# Patient Record
Sex: MALE | Race: WHITE | Employment: OTHER | URBAN - METROPOLITAN AREA
[De-identification: names, ages, dates, MRNs, and addresses within clinical notes are randomized per-mention and may not be internally consistent; named-entity substitution may affect disease eponyms.]

---

## 2017-09-28 PROBLEM — R55 VASO VAGAL EPISODE: Status: ACTIVE | Noted: 2017-09-28

## 2017-12-05 PROBLEM — R55 SYNCOPE, NON CARDIAC: Status: ACTIVE | Noted: 2017-12-05

## 2018-01-09 ENCOUNTER — TELEPHONE (OUTPATIENT)
Dept: CARDIOLOGY CLINIC | Age: 83
End: 2018-01-09

## 2018-01-18 ENCOUNTER — TELEPHONE (OUTPATIENT)
Dept: CARDIOLOGY CLINIC | Age: 83
End: 2018-01-18

## 2018-12-08 ENCOUNTER — APPOINTMENT (OUTPATIENT)
Dept: GENERAL RADIOLOGY | Age: 83
End: 2018-12-08
Payer: MEDICARE

## 2018-12-08 ENCOUNTER — HOSPITAL ENCOUNTER (EMERGENCY)
Age: 83
Discharge: HOME | End: 2018-12-08
Attending: EMERGENCY MEDICINE
Payer: MEDICARE

## 2018-12-08 VITALS
BODY MASS INDEX: 20.38 KG/M2 | HEART RATE: 78 BPM | OXYGEN SATURATION: 99 % | TEMPERATURE: 97.8 F | SYSTOLIC BLOOD PRESSURE: 120 MMHG | WEIGHT: 138 LBS | RESPIRATION RATE: 21 BRPM | DIASTOLIC BLOOD PRESSURE: 68 MMHG

## 2018-12-08 DIAGNOSIS — T88.7XXA MEDICATION SIDE EFFECT: ICD-10-CM

## 2018-12-08 DIAGNOSIS — R42 LIGHTHEADEDNESS: Primary | ICD-10-CM

## 2018-12-08 LAB
A/G RATIO: 1.3 (ref 1.1–2.2)
ALBUMIN SERPL-MCNC: 3.9 G/DL (ref 3.4–5)
ALP BLD-CCNC: 77 U/L (ref 40–129)
ALT SERPL-CCNC: 17 U/L (ref 10–40)
ANION GAP SERPL CALCULATED.3IONS-SCNC: 15 MMOL/L (ref 3–16)
AST SERPL-CCNC: 23 U/L (ref 15–37)
BASOPHILS ABSOLUTE: 0 K/UL (ref 0–0.2)
BASOPHILS RELATIVE PERCENT: 0.2 %
BILIRUB SERPL-MCNC: 0.6 MG/DL (ref 0–1)
BUN BLDV-MCNC: 25 MG/DL (ref 7–20)
CALCIUM SERPL-MCNC: 9.3 MG/DL (ref 8.3–10.6)
CHLORIDE BLD-SCNC: 103 MMOL/L (ref 99–110)
CO2: 23 MMOL/L (ref 21–32)
CREAT SERPL-MCNC: 1 MG/DL (ref 0.8–1.3)
EOSINOPHILS ABSOLUTE: 0 K/UL (ref 0–0.6)
EOSINOPHILS RELATIVE PERCENT: 0.4 %
GFR AFRICAN AMERICAN: >60
GFR NON-AFRICAN AMERICAN: >60
GLOBULIN: 3.1 G/DL
GLUCOSE BLD-MCNC: 120 MG/DL (ref 70–99)
HCT VFR BLD CALC: 41 % (ref 40.5–52.5)
HEMOGLOBIN: 13.6 G/DL (ref 13.5–17.5)
LYMPHOCYTES ABSOLUTE: 0.9 K/UL (ref 1–5.1)
LYMPHOCYTES RELATIVE PERCENT: 10.5 %
MAGNESIUM: 1.8 MG/DL (ref 1.8–2.4)
MCH RBC QN AUTO: 31.9 PG (ref 26–34)
MCHC RBC AUTO-ENTMCNC: 33.2 G/DL (ref 31–36)
MCV RBC AUTO: 96.1 FL (ref 80–100)
MONOCYTES ABSOLUTE: 0.9 K/UL (ref 0–1.3)
MONOCYTES RELATIVE PERCENT: 9.9 %
NEUTROPHILS ABSOLUTE: 6.9 K/UL (ref 1.7–7.7)
NEUTROPHILS RELATIVE PERCENT: 79 %
PDW BLD-RTO: 13.3 % (ref 12.4–15.4)
PLATELET # BLD: 184 K/UL (ref 135–450)
PMV BLD AUTO: 9.3 FL (ref 5–10.5)
POTASSIUM SERPL-SCNC: 3.7 MMOL/L (ref 3.5–5.1)
PRO-BNP: 958 PG/ML (ref 0–449)
RBC # BLD: 4.26 M/UL (ref 4.2–5.9)
SODIUM BLD-SCNC: 141 MMOL/L (ref 136–145)
TOTAL PROTEIN: 7 G/DL (ref 6.4–8.2)
TROPONIN: <0.01 NG/ML
WBC # BLD: 8.8 K/UL (ref 4–11)

## 2018-12-08 PROCEDURE — 83735 ASSAY OF MAGNESIUM: CPT

## 2018-12-08 PROCEDURE — 80053 COMPREHEN METABOLIC PANEL: CPT

## 2018-12-08 PROCEDURE — 84484 ASSAY OF TROPONIN QUANT: CPT

## 2018-12-08 PROCEDURE — 93005 ELECTROCARDIOGRAM TRACING: CPT | Performed by: EMERGENCY MEDICINE

## 2018-12-08 PROCEDURE — 85025 COMPLETE CBC W/AUTO DIFF WBC: CPT

## 2018-12-08 PROCEDURE — 99284 EMERGENCY DEPT VISIT MOD MDM: CPT

## 2018-12-08 PROCEDURE — 71046 X-RAY EXAM CHEST 2 VIEWS: CPT

## 2018-12-08 PROCEDURE — 83880 ASSAY OF NATRIURETIC PEPTIDE: CPT

## 2018-12-08 RX ORDER — AMOXICILLIN AND CLAVULANATE POTASSIUM 875; 125 MG/1; MG/1
1 TABLET, FILM COATED ORAL 2 TIMES DAILY
Qty: 14 TABLET | Refills: 0 | Status: SHIPPED | OUTPATIENT
Start: 2018-12-08 | End: 2018-12-15

## 2018-12-09 LAB
EKG ATRIAL RATE: 79 BPM
EKG DIAGNOSIS: NORMAL
EKG P AXIS: 59 DEGREES
EKG P-R INTERVAL: 172 MS
EKG Q-T INTERVAL: 384 MS
EKG QRS DURATION: 86 MS
EKG QTC CALCULATION (BAZETT): 440 MS
EKG R AXIS: 85 DEGREES
EKG T AXIS: 70 DEGREES
EKG VENTRICULAR RATE: 79 BPM

## 2018-12-09 PROCEDURE — 93010 ELECTROCARDIOGRAM REPORT: CPT | Performed by: INTERNAL MEDICINE

## 2018-12-09 NOTE — ED PROVIDER NOTES
 Sexual activity: Not Currently     Other Topics Concern    Not on file     Social History Narrative    No narrative on file     No current facility-administered medications for this encounter. Current Outpatient Prescriptions   Medication Sig Dispense Refill    amoxicillin-clavulanate (AUGMENTIN) 875-125 MG per tablet Take 1 tablet by mouth 2 times daily for 7 days 14 tablet 0    metoprolol tartrate (LOPRESSOR) 25 MG tablet Take 1 tablet by mouth 2 times daily 60 tablet 3    omeprazole (PRILOSEC) 40 MG delayed release capsule Take 40 mg by mouth daily      aspirin 325 MG tablet Take 1 tablet by mouth daily. 30 tablet 0    therapeutic multivitamin-minerals (THERAGRAN-M) tablet Take 1 tablet by mouth daily. Allergies   Allergen Reactions    Levaquin [Levofloxacin]     Moxifloxacin      Nursing Notes Reviewed    Physical Exam:  ED Triage Vitals [12/08/18 7919]   Enc Vitals Group      /72      Pulse 84      Resp 20      Temp 97.8 °F (36.6 °C)      Temp Source Oral      SpO2 100 %      Weight 138 lb (62.6 kg)      Height       Head Circumference       Peak Flow       Pain Score       Pain Loc       Pain Edu? Excl. in [address]? CONSTITUTIONAL:  Well-nourished well-hydrated. Elderly male. Talkative. NAD. Awake alert and oriented ×3. Cooperative. Follows commands. GCS of 15  HEAD: Normocephalic atraumatic head. EYES: Pupils equal round and reactive to light. EOMi  ENT: Nares clear. No sinus ttp. Mild drainage. MMM. NECK: Supple. Full range of motion. Trachea is midline  CARDIOLOGY: Adequate peripheral perfusion. S1 and S2.  Regular rate and rhythm. No murmurs rubs or gallops. No JVD. No lower extremity edema. +2 radial pulses bilaterally  RESPIRATORY: Nonlabored. Clear to auscultation bilaterally with no rhonchi wheezes or crackles. ABDOMEN: Positive bowel sounds. Soft nondistended nontender. MUSCULOSKELETAL: No gross deformities.   Moves all four extremities, good

## 2019-04-11 ENCOUNTER — HOSPITAL ENCOUNTER (OUTPATIENT)
Age: 84
Discharge: HOME | End: 2019-04-11
Payer: MEDICARE

## 2019-04-11 ENCOUNTER — HOSPITAL ENCOUNTER (OUTPATIENT)
Dept: GENERAL RADIOLOGY | Age: 84
Discharge: HOME | End: 2019-04-11
Payer: MEDICARE

## 2019-04-11 DIAGNOSIS — J18.9 COMMUNITY ACQUIRED PNEUMONIA, UNSPECIFIED LATERALITY: ICD-10-CM

## 2019-04-11 PROCEDURE — 71046 X-RAY EXAM CHEST 2 VIEWS: CPT

## 2020-08-05 ENCOUNTER — APPOINTMENT (OUTPATIENT)
Dept: CT IMAGING | Age: 85
End: 2020-08-05
Payer: MEDICARE

## 2020-08-05 ENCOUNTER — APPOINTMENT (OUTPATIENT)
Dept: GENERAL RADIOLOGY | Age: 85
End: 2020-08-05
Payer: MEDICARE

## 2020-08-05 ENCOUNTER — HOSPITAL ENCOUNTER (OUTPATIENT)
Age: 85
Setting detail: OBSERVATION
Discharge: HOME | End: 2020-08-06
Attending: EMERGENCY MEDICINE | Admitting: INTERNAL MEDICINE
Payer: MEDICARE

## 2020-08-05 PROBLEM — G45.9 TIA (TRANSIENT ISCHEMIC ATTACK): Status: ACTIVE | Noted: 2020-08-05

## 2020-08-05 LAB
A/G RATIO: 2 (ref 1.1–2.2)
ALBUMIN SERPL-MCNC: 4.5 G/DL (ref 3.4–5)
ALP BLD-CCNC: 98 U/L (ref 40–129)
ALT SERPL-CCNC: 17 U/L (ref 10–40)
AMMONIA: 18 UMOL/L (ref 16–60)
ANION GAP SERPL CALCULATED.3IONS-SCNC: 11 MMOL/L (ref 3–16)
AST SERPL-CCNC: 24 U/L (ref 15–37)
BASOPHILS ABSOLUTE: 0 K/UL (ref 0–0.2)
BASOPHILS RELATIVE PERCENT: 0.5 %
BILIRUB SERPL-MCNC: 0.4 MG/DL (ref 0–1)
BILIRUBIN URINE: NEGATIVE
BLOOD, URINE: NEGATIVE
BUN BLDV-MCNC: 33 MG/DL (ref 7–20)
CALCIUM SERPL-MCNC: 9.5 MG/DL (ref 8.3–10.6)
CHLORIDE BLD-SCNC: 104 MMOL/L (ref 99–110)
CLARITY: CLEAR
CO2: 24 MMOL/L (ref 21–32)
COLOR: YELLOW
CREAT SERPL-MCNC: 0.9 MG/DL (ref 0.8–1.3)
EOSINOPHILS ABSOLUTE: 0.1 K/UL (ref 0–0.6)
EOSINOPHILS RELATIVE PERCENT: 2 %
GFR AFRICAN AMERICAN: >60
GFR NON-AFRICAN AMERICAN: >60
GLOBULIN: 2.2 G/DL
GLUCOSE BLD-MCNC: 76 MG/DL (ref 70–99)
GLUCOSE URINE: NEGATIVE MG/DL
HCT VFR BLD CALC: 37.6 % (ref 40.5–52.5)
HEMOGLOBIN: 13 G/DL (ref 13.5–17.5)
INR BLD: 1.02 (ref 0.86–1.14)
KETONES, URINE: NEGATIVE MG/DL
LEUKOCYTE ESTERASE, URINE: NEGATIVE
LIPASE: 37 U/L (ref 13–60)
LYMPHOCYTES ABSOLUTE: 2.6 K/UL (ref 1–5.1)
LYMPHOCYTES RELATIVE PERCENT: 41.9 %
MCH RBC QN AUTO: 33.5 PG (ref 26–34)
MCHC RBC AUTO-ENTMCNC: 34.4 G/DL (ref 31–36)
MCV RBC AUTO: 97.4 FL (ref 80–100)
MICROSCOPIC EXAMINATION: NORMAL
MONOCYTES ABSOLUTE: 0.6 K/UL (ref 0–1.3)
MONOCYTES RELATIVE PERCENT: 10.1 %
NEUTROPHILS ABSOLUTE: 2.8 K/UL (ref 1.7–7.7)
NEUTROPHILS RELATIVE PERCENT: 45.5 %
NITRITE, URINE: NEGATIVE
PDW BLD-RTO: 14 % (ref 12.4–15.4)
PH UA: 7 (ref 5–8)
PLATELET # BLD: 210 K/UL (ref 135–450)
PMV BLD AUTO: 9.4 FL (ref 5–10.5)
POTASSIUM REFLEX MAGNESIUM: 4.1 MMOL/L (ref 3.5–5.1)
PROTEIN UA: NEGATIVE MG/DL
PROTHROMBIN TIME: 11.8 SEC (ref 10–13.2)
RBC # BLD: 3.87 M/UL (ref 4.2–5.9)
SODIUM BLD-SCNC: 139 MMOL/L (ref 136–145)
SPECIFIC GRAVITY UA: 1.01 (ref 1–1.03)
TOTAL PROTEIN: 6.7 G/DL (ref 6.4–8.2)
TROPONIN: <0.01 NG/ML
URINE REFLEX TO CULTURE: NORMAL
URINE TYPE: NORMAL
UROBILINOGEN, URINE: 0.2 E.U./DL
WBC # BLD: 6.1 K/UL (ref 4–11)

## 2020-08-05 PROCEDURE — 84484 ASSAY OF TROPONIN QUANT: CPT

## 2020-08-05 PROCEDURE — 80053 COMPREHEN METABOLIC PANEL: CPT

## 2020-08-05 PROCEDURE — 85025 COMPLETE CBC W/AUTO DIFF WBC: CPT

## 2020-08-05 PROCEDURE — 6360000004 HC RX CONTRAST MEDICATION: Performed by: PHYSICIAN ASSISTANT

## 2020-08-05 PROCEDURE — 85610 PROTHROMBIN TIME: CPT

## 2020-08-05 PROCEDURE — 81003 URINALYSIS AUTO W/O SCOPE: CPT

## 2020-08-05 PROCEDURE — 93005 ELECTROCARDIOGRAM TRACING: CPT | Performed by: PHYSICIAN ASSISTANT

## 2020-08-05 PROCEDURE — 83690 ASSAY OF LIPASE: CPT

## 2020-08-05 PROCEDURE — G0378 HOSPITAL OBSERVATION PER HR: HCPCS

## 2020-08-05 PROCEDURE — 99285 EMERGENCY DEPT VISIT HI MDM: CPT

## 2020-08-05 PROCEDURE — 70450 CT HEAD/BRAIN W/O DYE: CPT

## 2020-08-05 PROCEDURE — 82140 ASSAY OF AMMONIA: CPT

## 2020-08-05 PROCEDURE — 71045 X-RAY EXAM CHEST 1 VIEW: CPT

## 2020-08-05 PROCEDURE — 70496 CT ANGIOGRAPHY HEAD: CPT

## 2020-08-05 RX ORDER — POLYETHYLENE GLYCOL 3350 17 G/17G
17 POWDER, FOR SOLUTION ORAL DAILY PRN
Status: DISCONTINUED | OUTPATIENT
Start: 2020-08-05 | End: 2020-08-06 | Stop reason: HOSPADM

## 2020-08-05 RX ORDER — ASPIRIN 81 MG/1
81 TABLET ORAL DAILY
Status: DISCONTINUED | OUTPATIENT
Start: 2020-08-06 | End: 2020-08-06 | Stop reason: HOSPADM

## 2020-08-05 RX ORDER — SODIUM CHLORIDE 0.9 % (FLUSH) 0.9 %
10 SYRINGE (ML) INJECTION PRN
Status: DISCONTINUED | OUTPATIENT
Start: 2020-08-05 | End: 2020-08-06 | Stop reason: HOSPADM

## 2020-08-05 RX ORDER — ONDANSETRON 2 MG/ML
4 INJECTION INTRAMUSCULAR; INTRAVENOUS EVERY 6 HOURS PRN
Status: DISCONTINUED | OUTPATIENT
Start: 2020-08-05 | End: 2020-08-06 | Stop reason: HOSPADM

## 2020-08-05 RX ORDER — ATORVASTATIN CALCIUM 80 MG/1
40 TABLET, FILM COATED ORAL NIGHTLY
Status: DISCONTINUED | OUTPATIENT
Start: 2020-08-05 | End: 2020-08-06 | Stop reason: HOSPADM

## 2020-08-05 RX ORDER — LABETALOL HYDROCHLORIDE 5 MG/ML
10 INJECTION, SOLUTION INTRAVENOUS EVERY 10 MIN PRN
Status: DISCONTINUED | OUTPATIENT
Start: 2020-08-05 | End: 2020-08-06 | Stop reason: HOSPADM

## 2020-08-05 RX ORDER — ASPIRIN 81 MG/1
324 TABLET, CHEWABLE ORAL ONCE
Status: DISCONTINUED | OUTPATIENT
Start: 2020-08-05 | End: 2020-08-05

## 2020-08-05 RX ORDER — PROMETHAZINE HYDROCHLORIDE 25 MG/1
12.5 TABLET ORAL EVERY 6 HOURS PRN
Status: DISCONTINUED | OUTPATIENT
Start: 2020-08-05 | End: 2020-08-06 | Stop reason: HOSPADM

## 2020-08-05 RX ORDER — ASPIRIN 300 MG/1
300 SUPPOSITORY RECTAL DAILY
Status: DISCONTINUED | OUTPATIENT
Start: 2020-08-06 | End: 2020-08-06 | Stop reason: HOSPADM

## 2020-08-05 RX ORDER — SODIUM CHLORIDE 0.9 % (FLUSH) 0.9 %
10 SYRINGE (ML) INJECTION EVERY 12 HOURS SCHEDULED
Status: DISCONTINUED | OUTPATIENT
Start: 2020-08-05 | End: 2020-08-06 | Stop reason: HOSPADM

## 2020-08-05 RX ADMIN — IOPAMIDOL 75 ML: 755 INJECTION, SOLUTION INTRAVENOUS at 19:49

## 2020-08-05 NOTE — ED NOTES
Bed: 02  Expected date:   Expected time:   Means of arrival:   Comments:  [facility], [address] [facility]  08/05/20 1944

## 2020-08-06 ENCOUNTER — APPOINTMENT (OUTPATIENT)
Dept: GENERAL RADIOLOGY | Age: 85
End: 2020-08-06
Payer: MEDICARE

## 2020-08-06 ENCOUNTER — APPOINTMENT (OUTPATIENT)
Dept: MRI IMAGING | Age: 85
End: 2020-08-06
Payer: MEDICARE

## 2020-08-06 VITALS
OXYGEN SATURATION: 98 % | HEIGHT: 69 IN | RESPIRATION RATE: 17 BRPM | BODY MASS INDEX: 19.96 KG/M2 | SYSTOLIC BLOOD PRESSURE: 155 MMHG | WEIGHT: 134.8 LBS | HEART RATE: 57 BPM | TEMPERATURE: 97.4 F | DIASTOLIC BLOOD PRESSURE: 75 MMHG

## 2020-08-06 LAB
CHOLESTEROL, TOTAL: 161 MG/DL (ref 0–199)
EKG ATRIAL RATE: 71 BPM
EKG DIAGNOSIS: NORMAL
EKG P AXIS: 70 DEGREES
EKG P-R INTERVAL: 194 MS
EKG Q-T INTERVAL: 412 MS
EKG QRS DURATION: 88 MS
EKG QTC CALCULATION (BAZETT): 447 MS
EKG R AXIS: 73 DEGREES
EKG T AXIS: 52 DEGREES
EKG VENTRICULAR RATE: 71 BPM
GLUCOSE BLD-MCNC: 102 MG/DL (ref 70–99)
GLUCOSE BLD-MCNC: 112 MG/DL (ref 70–99)
HCT VFR BLD CALC: 36.7 % (ref 40.5–52.5)
HDLC SERPL-MCNC: 49 MG/DL (ref 40–60)
HEMOGLOBIN: 12.3 G/DL (ref 13.5–17.5)
LDL CHOLESTEROL CALCULATED: NORMAL MG/DL
LDL CHOLESTEROL DIRECT: 111 MG/DL
LV EF: 58 %
LVEF MODALITY: NORMAL
MCH RBC QN AUTO: 32.4 PG (ref 26–34)
MCHC RBC AUTO-ENTMCNC: 33.4 G/DL (ref 31–36)
MCV RBC AUTO: 97.1 FL (ref 80–100)
PDW BLD-RTO: 13.6 % (ref 12.4–15.4)
PERFORMED ON: ABNORMAL
PERFORMED ON: ABNORMAL
PLATELET # BLD: 186 K/UL (ref 135–450)
PMV BLD AUTO: 9.3 FL (ref 5–10.5)
RBC # BLD: 3.78 M/UL (ref 4.2–5.9)
TRIGL SERPL-MCNC: 28 MG/DL (ref 0–150)
VLDLC SERPL CALC-MCNC: NORMAL MG/DL
WBC # BLD: 5.9 K/UL (ref 4–11)

## 2020-08-06 PROCEDURE — 80061 LIPID PANEL: CPT

## 2020-08-06 PROCEDURE — 93306 TTE W/DOPPLER COMPLETE: CPT

## 2020-08-06 PROCEDURE — 92610 EVALUATE SWALLOWING FUNCTION: CPT

## 2020-08-06 PROCEDURE — G0378 HOSPITAL OBSERVATION PER HR: HCPCS

## 2020-08-06 PROCEDURE — 36415 COLL VENOUS BLD VENIPUNCTURE: CPT

## 2020-08-06 PROCEDURE — 97165 OT EVAL LOW COMPLEX 30 MIN: CPT

## 2020-08-06 PROCEDURE — 6370000000 HC RX 637 (ALT 250 FOR IP): Performed by: INTERNAL MEDICINE

## 2020-08-06 PROCEDURE — 85027 COMPLETE CBC AUTOMATED: CPT

## 2020-08-06 PROCEDURE — 70551 MRI BRAIN STEM W/O DYE: CPT

## 2020-08-06 PROCEDURE — 92526 ORAL FUNCTION THERAPY: CPT

## 2020-08-06 PROCEDURE — 96372 THER/PROPH/DIAG INJ SC/IM: CPT

## 2020-08-06 PROCEDURE — 6360000002 HC RX W HCPCS: Performed by: INTERNAL MEDICINE

## 2020-08-06 PROCEDURE — 74230 X-RAY XM SWLNG FUNCJ C+: CPT

## 2020-08-06 PROCEDURE — 97530 THERAPEUTIC ACTIVITIES: CPT

## 2020-08-06 PROCEDURE — 2580000003 HC RX 258: Performed by: INTERNAL MEDICINE

## 2020-08-06 PROCEDURE — 93010 ELECTROCARDIOGRAM REPORT: CPT | Performed by: INTERNAL MEDICINE

## 2020-08-06 PROCEDURE — 97116 GAIT TRAINING THERAPY: CPT

## 2020-08-06 PROCEDURE — 99220 PR INITIAL OBSERVATION CARE/DAY 70 MINUTES: CPT | Performed by: PSYCHIATRY & NEUROLOGY

## 2020-08-06 PROCEDURE — 97161 PT EVAL LOW COMPLEX 20 MIN: CPT

## 2020-08-06 PROCEDURE — 92611 MOTION FLUOROSCOPY/SWALLOW: CPT

## 2020-08-06 PROCEDURE — 92523 SPEECH SOUND LANG COMPREHEN: CPT

## 2020-08-06 RX ORDER — ATORVASTATIN CALCIUM 40 MG/1
40 TABLET, FILM COATED ORAL NIGHTLY
Qty: 30 TABLET | Refills: 3 | Status: SHIPPED | OUTPATIENT
Start: 2020-08-06

## 2020-08-06 RX ORDER — PANTOPRAZOLE SODIUM 40 MG/1
40 TABLET, DELAYED RELEASE ORAL
Status: DISCONTINUED | OUTPATIENT
Start: 2020-08-06 | End: 2020-08-06 | Stop reason: HOSPADM

## 2020-08-06 RX ORDER — MELOXICAM 15 MG/1
15 TABLET ORAL DAILY
COMMUNITY

## 2020-08-06 RX ORDER — M-VIT,TX,IRON,MINS/CALC/FOLIC 27MG-0.4MG
1 TABLET ORAL DAILY
Status: DISCONTINUED | OUTPATIENT
Start: 2020-08-06 | End: 2020-08-06 | Stop reason: HOSPADM

## 2020-08-06 RX ORDER — TAMSULOSIN HYDROCHLORIDE 0.4 MG/1
0.4 CAPSULE ORAL NIGHTLY
COMMUNITY

## 2020-08-06 RX ADMIN — Medication 10 ML: at 09:21

## 2020-08-06 RX ADMIN — ASPIRIN 81 MG: 81 TABLET, COATED ORAL at 09:20

## 2020-08-06 RX ADMIN — MULTIPLE VITAMINS W/ MINERALS TAB 1 TABLET: TAB at 09:20

## 2020-08-06 RX ADMIN — PANTOPRAZOLE SODIUM 40 MG: 40 TABLET, DELAYED RELEASE ORAL at 09:20

## 2020-08-06 RX ADMIN — ENOXAPARIN SODIUM 40 MG: 40 INJECTION SUBCUTANEOUS at 09:21

## 2020-08-06 ASSESSMENT — PAIN SCALES - GENERAL
PAINLEVEL_OUTOF10: 0
PAINLEVEL_OUTOF10: 0

## 2020-08-06 NOTE — PROCEDURES
use of aspiration precautions and use of small bolus amounts. Aspiration/Penetration Risk:  Increased risk with large amounts of thin liquids and increased textures   Increased risk with all textures due to reduced/delayed pharyngeal clearing    Recommendations:    Diet Level:   1.) Dysphagia III Soft and Bite-Sized with thin liquids, no straws, meds in puree   2.) Use of SMALL sips with liquids    Strategies:  Upright 90 degrees at meals; No Straws;  Meds with puree; Small bites/sips  Treatments: Speech Therapy for dysphagia treatment  Goals:  1)Pt will tolerate diet with no signs or symptoms of aspiration   2) Pt will improve Oral Motor/ Bolus Control via exercises to 5/5 each   3)Pt will improve sensory/motor reflexive responses via DPNS    Consistencies given: Thin, Mildly Thick (Nectar) Liquids, Moderately Thick (honey) Liquids, Puree, Soft solid, Solid    Oral Phase  -Prolonged mastication  -Reduced bolus control  -Decreased palatal retraction for bolus transfer  -Lingual pumping  -Reduced A-P bolus propulsion    Pharyngeal Phase  -Delayed swallow onset  -Decreased epiglottic distension  -Pooling to the pyriforms with solids  -Decreased hyolaryngeal excursion   -Decreased/delayed UES opening  -Laryngeal penetration with thin liquids, nectar thick liquids, and honey thick liquids  -Decreased pharyngeal clearing with pyriform residue (increased with thicker textures)  -No aspiration was viewed with any texture during study     Esophageal Phase  Unremarkable    Following Evaluation:  Results/recommendations and education given to Patient (requires ongoing education) and nurse, who verbalized understanding    Timed Code Treatment: 0 minutes    Total Treatment Time: 30 minutes    Nic Bailey M.A., [address] [place] [address]  Speech-Language Pathologist

## 2020-08-06 NOTE — ED NOTES
Pt ambulatory at home and lives at home alone, son currently at bedside. Pt denies recent falls. Continent of bowel and bladder.  A&OX4      Frank Yoder, RN  52/43/90 [id number]

## 2020-08-06 NOTE — PROGRESS NOTES
VSS,  PT MRI neg for [place],  ECHO ok. PT/OT recommend pt can go home to independent living. Rcv'd order to DC pt to home.

## 2020-08-06 NOTE — PROGRESS NOTES
Speech Language/Pathology   SPEECH LANGUAGE AND CLINICAL BEDSIDE SWALLOWING EVALUATION   Speech Therapy Department     Patient Name:  Marii Minor  :  1935  Pain level: Pt did not report pain  Medical Diagnosis:  TIA (transient ischemic attack) [G45.9]  TIA (transient ischemic attack) [G45.9]    1. HPI: Per chart: TIA, suspected. Continue antiplatelet therapy, statin. Check lipid profile in the morning. Monitor on telemetry. Patient does have history of paroxysmal atrial fibrillation in the past; consider cardiology consult to assist with evaluation for possible anticoagulation. Check echocardiogram with bubble study. MRI-brain without contrast.  Neurology consult. Neurochecks ordered. PT/OT/ST consults. Other comorbidities: History of paroxysmal atrial fibrillation not on chronic anticoagulation. Chest x-ray:  Increased lung markings at the bilateral parahilar regions, may be related to    mild pulmonary vascular congestion versus bronchitis. CT of Head:  No acute intracranial abnormality. CLINICAL SWALLOW EVALUATION:  Dysphagia Treatment Diagnosis: Mild-Moderate Oropharyngeal Dysphagia     Impressions: Pt was seen sitting upright in bed, Pt reported difficulty with swallowing however had difficulty providing details. Pt reported he has lost weight recently. Various textures were provided to assess swallow function. Pt presents with mild-moderate oropharyngeal dysphagia characterized by prolonged mastication, suspected premature bolus loss to pharynx, delayed swallow initiation, and reduced laryngeal elevation upon manual palpation. Thin liquids via cup revealed use of multiple swallows with suspected reduced pharyngeal clearing, and intermittent delayed coughing/throat clearing. Pt reported noted difficulty with thin liquids. Improved tolerance was assessed with nectar thick liquids with no overt clinical s/s of aspiration/penetration.  Prolonged mastication was noted with regular solids, given extra time adequate clearing was achieved. Pt did report difficulty with swallowing hard/dry textures at home. At this time, a MBS is recommended to further assess the pharyngeal phase of swallowing. Dietary Recommendations:   1.) Dysphagia III Soft and Bite-Sized with Mildly Thick (Nectar) Liquids, no straws, meds in puree  2.) Recommend Modified Barium Swallow Study to further assess the pharyngeal phase of swallowing and to assess for aspiration     Strategies: 90 degree positioning with all p.o. intake; small bites/sips; alternate textures through meal; reduce rate of intake; No straws     Dysphagia Treatment/Goals: Speech therapy for dysphagia tx 3-5 times per week. ST.) Pt will tolerate recommended diet without s/s of aspiration   2.) If clinical symptoms of penetration/aspiration continue to be noted,Pt will tolerate MBS to r/o aspiration and determine appropriate diet/liquid level.    3.) Pt will tolerate diet advance to least restricted diet, as clinically indicated, with no signs of aspiration      SPEECH LANGUAGE EVALUATION:  Speech Language Treatment Diagnosis: Cognitive-Linguistic Deficits     Oral Motor exam/Motor Speech:  Oral Motor: no overt facial weakness noted   Motor Speech: no overt dysarthria, speech is intelligible      Verbal Expression:   -Able to express thoughts, intermittent word finding difficulty noted however appeared to be related to cognition/memory deficits  -No overt difficulty with basic naming tasks  -Tangential speech noted with reduced thought organization    Auditory Comprehension:   -Mildly delayed processing with complex information/directions  -Able to follow one and two step commands, repetition required for three step commands    Cognitive-Linguistic:   -Oriented to self, facility, current month, date, and year  -Decreased recall of detailed events  -Difficulty providing direct responses to questions, tangential speech noted  -Reduced thought organization  -Pt reported difficulty with his memory, Pt's son has noticed a gradual decline however Pt is still largely independent  -Reduced divided attention  -Recommend ongoing assessment of cognitive-linguistic skills     Plan: Speech therapy 3-5 times a weeks for speech-language treatment, and dysphagia treatment     Discharge Recommendations:  Pt will benefit from continued skilled Speech Therapy for Speech and Dysphagia services, prior to returning home. Prior Status: Pt lives at home and is largely independent per report. Speech Language Short-term goals: 1. Pt will improve auditory processing/comprehension of complex commands and questions to 80%, via graded tasks   2. Pt will improve short term recall via graded tasks to 80%  3. Pt will participate in ongoing assessment of cognitive-linguistic skills as indicated. Patient/Family Education:Education, results and recommendations given to the Pt and nurse, who verbalized understanding    Timed Code Treatment:  0 minutes    Total Treatment Time:  50 minutes     If patient discharges prior to next session this note will serve as a discharge summary.      Bertha Gonzalez M.A., [address] [place] [address]  Speech-Language Pathologist

## 2020-08-06 NOTE — H&P
Hospital Medicine History and Physical    8/5/2020    Date of Admission: 8/5/2020    Date of Service: Pt seen/examined on 8/5/2020 and admitted to observation. Assessment/plan:  1. TIA, suspected. Continue antiplatelet therapy, statin. Check lipid profile in the morning. Monitor on telemetry. Patient does have history of paroxysmal atrial fibrillation in the past; consider cardiology consult to assist with evaluation for possible anticoagulation. Check echocardiogram with bubble study. MRI-brain without contrast.  Neurology consult. Neurochecks ordered. PT/OT/ST consults. 2. Other comorbidities: History of paroxysmal atrial fibrillation not on chronic anticoagulation. Activities: Up with assist  Prophylaxis: Subcutaneous Lovenox, PPI  Code status: Full code    ==========================================================  Chief complaint:  Chief Complaint   Patient presents with    Altered Mental Status     Pt at [facility], brought in via [facility], friends from [facility] called and stated that patient had altered mental status. Stroke alert called. History of Presenting Illness: This is a pleasant 81 y.o. male with history of paroxysmal atrial fibrillation (not on chronic anticoagulation), BPH, who presents from [facility] where he was noticed to be staggering, confused. According to paramedics, on arrival, it appeared patient had left facial droop. By the time patient arrived in the emergency room, symptom had resolved. He was alert and oriented, had no facial droop in the emergency room. He reports taking aspirin 325 mg this morning. He does not have any symptoms at this time. Stroke alert was called, did not receive TPA due to resolution of symptoms. He had normal CT-head, CTA-head/neck. Labs were pretty much unremarkable. Patient is being admitted for further evaluation of possible TIA.     Past Medical History:      Diagnosis Date    Arthritis     cervical degenerative joint affect. Not agitated. Skin: Warm, dry with normal turgor. No rash  Capillary refill: Brisk,< 3 seconds   Peripheral Pulses: +2 palpable, equal bilaterally       Labs/imaging/EKG:  CBC:   Recent Labs     08/05/20 2024   WBC 6.1   HGB 13.0*        BMP:    Recent Labs     08/05/20 2024      K 4.1      CO2 24   BUN 33*   CREATININE 0.9   GLUCOSE 76     Hepatic:   Recent Labs     08/05/20 2024   AST 24   ALT 17   BILITOT 0.4   ALKPHOS 98       Ct Head Wo Contrast    Result Date: 8/5/2020  EXAMINATION: CT OF THE HEAD WITHOUT CONTRAST  8/5/2020 4:45 pm TECHNIQUE: CT of the head was performed without the administration of intravenous contrast. Dose modulation, iterative reconstruction, and/or weight based adjustment of the mA/kV was utilized to reduce the radiation dose to as low as reasonably achievable. COMPARISON: 12/05/2017 HISTORY: ORDERING SYSTEM PROVIDED HISTORY: AMS TECHNOLOGIST PROVIDED HISTORY: Has a \"code stroke\" or \"stroke alert\" been called? ->Yes Reason for exam:->AMS Reason for Exam: AMS. Acuity: Acute Type of Exam: Initial FINDINGS: BRAIN/VENTRICLES:  No acute loss of the gray-white matter differentiation is identified to suggest acute or subacute infarct. No masses or hemorrhages within the brain parenchyma are found. No evidence of midline shift. There is mild periventricular low-attenuation, compatible with chronic small vessel ischemic disease. The intracranial vasculature, including the dural venous sinuses, is within normal limits. ORBITS: No acute orbital abnormalities are identified. SINUSES: The visualized paranasal sinuses and mastoid air cells are clear. SOFT TISSUES/SKULL: The calvarium is intact. Extracranial soft tissues are unremarkable. No acute intracranial abnormality. Findings were discussed with Audrey Yoon at 7:55 pm on 8/5/2020.      Xr Chest Portable    Result Date: 8/5/2020  EXAMINATION: ONE XRAY VIEW OF THE CHEST 8/5/2020 7:45 pm COMPARISON: April 11, 2019 HISTORY: ORDERING SYSTEM PROVIDED HISTORY: AMS TECHNOLOGIST PROVIDED HISTORY: Reason for exam:->AMS FINDINGS: The cardiomediastinal silhouette is stable. There are increased lung markings at the bilateral parahilar regions, likely related to mild pulmonary vascular congestion versus bronchitis. There is no pleural effusion. There is no pneumothorax. There is no acute osseous abnormality. Increased lung markings at the bilateral parahilar regions, may be related to mild pulmonary vascular congestion versus bronchitis. Cta Head Neck W Contrast    Result Date: 8/5/2020  EXAMINATION: CTA OF THE HEAD AND NECK WITH CONTRAST 8/5/2020 7:49 pm: TECHNIQUE: CTA of the head and neck was performed with the administration of intravenous contrast. Multiplanar reformatted images are provided for review. MIP images are provided for review. Stenosis of the internal carotid arteries measured using NASCET criteria. Dose modulation, iterative reconstruction, and/or weight based adjustment of the mA/kV was utilized to reduce the radiation dose to as low as reasonably achievable. COMPARISON: None. HISTORY: ORDERING SYSTEM PROVIDED HISTORY: AMS TECHNOLOGIST PROVIDED HISTORY: Reason for exam:->AMS Reason for Exam: AMS Acuity: Acute Type of Exam: Initial FINDINGS: CTA NECK: AORTIC ARCH/ARCH VESSELS: No dissection or arterial injury. No significant stenosis of the brachiocephalic or subclavian arteries. CAROTID ARTERIES: No dissection, arterial injury, or hemodynamically significant stenosis by NASCET criteria. VERTEBRAL ARTERIES: No dissection, arterial injury, or significant stenosis. SOFT TISSUES: The lung apices are clear. No cervical or superior mediastinal lymphadenopathy. The larynx and pharynx are unremarkable. No acute abnormality of the salivary and thyroid glands. BONES: There is no acute fracture or suspect osseous lesion.   There are flowing anterior osteophytes in the lower cervical and upper thoracic spine compatible with diffuse idiopathic skeletal hyperostosis. No significant bony spinal canal stenosis evident. CTA HEAD: ANTERIOR CIRCULATION: No significant stenosis of the intracranial internal carotid, anterior cerebral, or middle cerebral arteries. The right anterior cerebral artery A1 segment is hypoplastic. No aneurysm. POSTERIOR CIRCULATION: No significant stenosis of the vertebral, basilar, or posterior cerebral arteries. No aneurysm. OTHER: No dural venous sinus thrombosis on this non-dedicated study. BRAIN: No mass effect or midline shift. No extra-axial fluid collection. The gray-white differentiation is maintained. No acute arterial abnormality or hemodynamically significant arterial stenosis in the head or neck. EKG: Normal sinus rhythm with no acute ST/T changes. I reviewed EKG. Discussed with ER provider.       Thank you Veola Peabody, MD for the opportunity to be involved in this patient's care.    -----------------------------  Michaela Grullon MD  [facility] hospitalist

## 2020-08-06 NOTE — PROGRESS NOTES
Pt appears A&Ox4 but is impulsive. VSS and assessment complete. Orthostatic BP checked. NIHSS 0. Neurochecks completed. Pt now resting with eyes closed.

## 2020-08-06 NOTE — PLAN OF CARE
Nutrition Problem #1: Inadequate energy intake  Intervention: Food and/or Nutrient Delivery: Continue Current Diet, Start Oral Nutrition Supplement  Nutritional Goals: PO 50% or more at meals/supp

## 2020-08-06 NOTE — PROGRESS NOTES
[facility] PROGRESS NOTE    8/6/2020 9:58 AM        Name: Zulma Smith . Admitted: 8/5/2020  Primary Care Provider: Angela Coffman MD (Tel: [phone])                        Subjective:  . No acute events overnight. Resting well. Pain control. Diet ok. Labs reviewed  Denies any chest pain sob. Reviewed interval ancillary notes    Current Medications  pantoprazole (PROTONIX) tablet 40 mg, QAM AC  therapeutic multivitamin-minerals 1 tablet, Daily  sodium chloride flush 0.9 % injection 10 mL, 2 times per day  sodium chloride flush 0.9 % injection 10 mL, PRN  polyethylene glycol (GLYCOLAX) packet 17 g, Daily PRN  promethazine (PHENERGAN) tablet 12.5 mg, Q6H PRN    Or  ondansetron (ZOFRAN) injection 4 mg, Q6H PRN  atorvastatin (LIPITOR) tablet 40 mg, Nightly  enoxaparin (LOVENOX) injection 40 mg, Daily  aspirin EC tablet 81 mg, Daily    Or  aspirin suppository 300 mg, Daily  labetalol (NORMODYNE;TRANDATE) injection 10 mg, Q10 Min PRN        Objective:  BP (!) 153/90   Pulse 62   Temp 97.5 °F (36.4 °C) (Temporal)   Resp 16   Ht 5' 9\" (1.753 m)   Wt 134 lb 12.8 oz (61.1 kg)   SpO2 99%   BMI 19.91 kg/m²   No intake or output data in the 24 hours ending 08/06/20 0958   Wt Readings from Last 3 Encounters:   08/06/20 134 lb 12.8 oz (61.1 kg)   12/08/18 138 lb (62.6 kg)   12/06/17 142 lb 1.6 oz (64.5 kg)       General appearance:  Appears comfortable  Eyes: Sclera clear. Pupils equal.  ENT: Moist oral mucosa. Trachea midline, no adenopathy. Cardiovascular: Regular rhythm, normal S1, S2. No murmur. No edema in lower extremities  Respiratory: Not using accessory muscles. Good inspiratory effort. Clear to auscultation bilaterally, no wheeze or crackles.    GI: Abdomen soft, no tenderness, not distended, normal bowel sounds  Musculoskeletal: No cyanosis in digits, neck

## 2020-08-06 NOTE — PROGRESS NOTES
Data- discharge order received, pt verbalized agreement to discharge, disposition to previous residence, no needs for HHC/DME. Action- discharge instructions prepared and given to pt, pt verbalized understanding. Medication information packet given r/t NEW and/or CHANGED prescriptions emphasizing name/purpose/side effects, pt verbalized understanding. Discharge instruction summary: Diet- dysphagia advanced, Activity- as tolerated, avoid working outside in heat of day, Primary Care Physician as follows: Nehemiah Maldonado -[phone] f/u appointment this week, immunizations reviewed, prescription medications filled electronically to [facility] in [address]. Response- Pt belongings gathered, IV removed. Disposition is home (no HHC/DME needs), transported with belongings and son Lazarus Durbin, taken to lobby via w/c w/ with nurse, no complications.

## 2020-08-06 NOTE — CARE COORDINATION
Patient admitted as Observation/OPIB with an anticipated short hospitalization length of stay. Chart reviewed and it appears that patient has minimal needs for discharge at this time. Discussed with patients nurse and requested that case management be notified if discharge needs are identified. Am-Pac 24/24=PT, 22/24 =OT due to decreased cognition, & memory. *Case management will continue to follow progress and update discharge plan as needed.

## 2020-08-06 NOTE — ED NOTES
When patient was being lifted onto stretcher with ceiling lift, patients head hit CT scanner, small cut to forehead, provider aware and CT repeated.       Gwynne Cushing, RN  09/98/49 7260

## 2020-08-06 NOTE — ED NOTES
Report given to Hernan Rojo, nurse resuming care of patient. No questions or concerns at this time.       Nasrin Alvarado RN  64/09/80 0010

## 2020-08-06 NOTE — ED PROVIDER NOTES
This patient was seen by the Mid-Level Provider. I have seen and examined the patient, agree with the workup, evaluation, management and diagnosis. Care plan has been discussed. My assessment reveals a 41-year-old male who presents with an episode of confusion. This is a 41-year-old male who was at [employer] who apparently became confused. When he arrived the EMS also felt that he had some left-sided facial drooping that his gotten better and resolved. The patient is a poor historian and is difficult to get a good history out of him. He denies any symptoms at this time. Radiology results:    XR CHEST PORTABLE   Final Result   Increased lung markings at the bilateral parahilar regions, may be related to   mild pulmonary vascular congestion versus bronchitis. CTA HEAD NECK W CONTRAST   Final Result   No acute arterial abnormality or hemodynamically significant arterial   stenosis in the head or neck. CT HEAD WO CONTRAST   Final Result   Addendum 1 of 1   ADDENDUM:   Additional scan was performed after the patient hit his head on the CT   scanner. There is contrast within the vasculature, which limits detection   intracranial hemorrhage. That said, no masses or hemorrhages within the   brain parenchyma are found. Gray-white matter differentiation is    maintained. There is a small focus of enhancement seen within the rightward aspect of    the   hai measuring approximately 5 x 3 mm (series 2, image 19). 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       All in all, no acute traumatic abnormalities are detected         Final      MRI brain without contrast    (Results Pending)         LABS:    Labs Reviewed   CBC WITH AUTO DIFFERENTIAL - Abnormal; Notable for the following components:       Result Value    RBC 3.87 (*)     Hemoglobin 13.0 (*)     Hematocrit 37.6 (*)     All other components within normal limits Narrative:     Performed at:  [facility]  [address] [employer], [place]   Phone [phone]   COMPREHENSIVE METABOLIC PANEL W/ REFLEX TO MG FOR LOW K - Abnormal; Notable for the following components:    BUN 33 (*)     All other components within normal limits    Narrative:     Performed at:  [facility]  [address] [employer], [place]   Phone [phone]   TROPONIN    Narrative:     Performed at:  [facility] [address] [employer], [place]   Phone [phone]   PROTIME-INR    Narrative:     Performed at:  [facility] [address], [place]   Phone [phone]   URINE RT REFLEX TO CULTURE    Narrative:     Performed at:  [facility] [address], [place]   Phone [phone]   AMMONIA    Narrative:     Performed at:  [facility] [address] [employer], [place]   Phone [phone]   LIPASE    Narrative:     Performed at:  [facility]  [address]   Phone [phone]   LIPID PANEL   CBC   POCT GLUCOSE           EKG:    Sinus rhythm at a rate of 71 beats a minute with no acute ST elevations or depressions or pathologic Q waves. Normal axis. Exam:    Well-nourished male in no acute distress. He had no obvious focal neurological motor or sensory deficits throughout. Medical decision making: This is an 71-year-old male who presents with an episode of confusion and some possible left-sided facial drooping. His work-up so far is unremarkable normal.  My differential included CVA versus TIA. He will be admitted for further care. FINAL IMPRESSION:    1.  Altered mental status, unspecified altered mental status type      Critical care time: 35 minutes of critical care time spent with this patient with multiple visits to the bedside.        Belkis Guzman MD  08/05/20 1516

## 2020-08-06 NOTE — CONSULTS
kidney disease     Glaucoma     [place] (hard of hearing)     Macular degeneration disease     Movement disorder     degenerative joint disease    Pneumonia     twice in past 2 years     Social History     Tobacco Use    Smoking status: Former Smoker     Last attempt to quit: 1973     Years since quittin.9    Smokeless tobacco: Never Used   Substance Use Topics    Alcohol use: No    Drug use: Not on file     Allergies   Allergen Reactions    Levaquin [Levofloxacin]     Moxifloxacin      Current Facility-Administered Medications   Medication Dose Route Frequency Provider Last Rate Last Dose    pantoprazole (PROTONIX) tablet 40 mg  40 mg Oral QAM AC Adeel Tenorio MD   40 mg at 20 0920    therapeutic multivitamin-minerals 1 tablet  1 tablet Oral Daily Adeel Tenorio MD   1 tablet at 20 0920    sodium chloride flush 0.9 % injection 10 mL  10 mL Intravenous 2 times per day Adeel Tenorio MD   10 mL at 20 5433    sodium chloride flush 0.9 % injection 10 mL  10 mL Intravenous PRN Adeel Tenorio MD        polyethylene glycol (GLYCOLAX) packet 17 g  17 g Oral Daily PRN Adeel Tenorio MD        promethazine (PHENERGAN) tablet 12.5 mg  12.5 mg Oral Q6H PRN Adeel Tenorio MD        Or    ondansetron (ZOFRAN) injection 4 mg  4 mg Intravenous Q6H PRN Adeel Tenorio MD        atorvastatin (LIPITOR) tablet 40 mg  40 mg Oral Nightly Adeel Tenorio MD        enoxaparin (LOVENOX) injection 40 mg  40 mg Subcutaneous Daily Adeel Tenorio MD   40 mg at 20 4151    aspirin EC tablet 81 mg  81 mg Oral Daily Adeel Tenorio MD   81 mg at 20 0920    Or    aspirin suppository 300 mg  300 mg Rectal Daily Adeel Tneorio MD        labetalol (NORMODYNE;TRANDATE) injection 10 mg  10 mg Intravenous Q10 Min PRN Adeel Tenorio MD           ROS : A 10-14 system review of constitutional, cardiovascular, respiratory, eyes, musculoskeletal, endocrine, GI, ENT, skin, hematological, genitourinary, psychiatric and neurologic systems was obtained and updated today and is unremarkable except as mentioned in my HPI      Exam:     Constitutional:   Vitals:    08/06/20 0057 08/06/20 0502 08/06/20 0832 08/06/20 0915   BP: (!) 162/62 128/73  (!) 153/90   Pulse: 64 57  62   Resp: 18 18  16   Temp: 97.6 °F (36.4 °C) 97.5 °F (36.4 °C)  97.5 °F (36.4 °C)   TempSrc: Temporal Temporal  Temporal   SpO2: 100% 99%  99%   Weight:   134 lb 12.8 oz (61.1 kg)    Height:           General appearance:  Normal development and appear in no acute distress. Eye: No icterus. Fundus: Funduscopic examination cannot be performed due to COVID19 restrictions and precautions. Neck: supple  Cardiovascular: No lower leg edema with good pulsation. Mental Status:   AAO x3  Good attention and concentration  Language is fluent and no evidence of aphasia. Poor fund of knowledge and poor insight  Poor immediate recall  Intact remote memory  Cranial Nerves:   II: Visual fields: Full. Pupils: equal, round, reactive to light  III,IV,VI: Extra Ocular Movements are intact. No nystagmus  V: Facial sensation is intact   VII: Facial strength and movements: intact and symmetric  VIII: Hearing: Intact to finger rub bilaterally  IX: Palate elevation is symmetric  XI: Shoulder shrug is intact  XII: Tongue movements are normal  Musculoskeletal: 5/5 in all 4 extremities. Tone: Normal tone. Reflexes: Bilateral biceps 2/4, triceps 2/4, brachial radialis 2/4, knee 2/4 and ankle 2/4. Planters: flexor bilaterally. Coordination: no pronator drift, no dysmetria with FNF in upper extremities. Normal REM. Sensation: normal to all modalities in both arms and legs. Gait/Posture: steady gait and normal posturing and station.      Data:  LABS:   Lab Results   Component Value Date     08/05/2020    K 4.1 08/05/2020     08/05/2020    CO2 24 08/05/2020    BUN 33 08/05/2020    CREATININE 0.9 08/05/2020    GFRAA >60 08/05/2020    GFRAA >60 03/12/2013    LABGLOM >60 08/05/2020    GLUCOSE 76 08/05/2020    PHOS 2.8 03/02/2015    MG 1.80 12/08/2018    CALCIUM 9.5 08/05/2020     Lab Results   Component Value Date    WBC 5.9 08/06/2020    RBC 3.78 08/06/2020    HGB 12.3 08/06/2020    HCT 36.7 08/06/2020    MCV 97.1 08/06/2020    RDW 13.6 08/06/2020     08/06/2020     Lab Results   Component Value Date    INR 1.02 08/05/2020    PROTIME 11.8 08/05/2020       Neuroimaging were independently reviewed by myself and discussed results with the patient  Reviewed notes from different physicians  Reviewed lab and blood testing    Impression:  Acute encephalopathy with a left facial droop. Possible TIA. So far cryptogenic. Hypertension, not controlled  History of PAF  Chronic cognitive impairment  Anemia    Recommendation:  MRI brain  Echo  A1c  Blood sugar monitor and control  Aspirin  Statin  Telemetry  Continue blood pressure monitor and resume home blood pressure medications  DVT and GI prophylaxis  Speech evaluation  Long discussion with the patient today regarding stroke prevention and risk of recurrence. Follow CBC  Could be a candidate for anticoagulation after obtaining MRI of the brain and after weighing benefits/risk ratio with the patient. He denies any recurrent falling or history of GI bleed in the past.  Patient however has underlying dementia based on examination today. Will follow. MDM: High complexity  ------------------------------------------------------------------------------      ABCD 4 with risk of stroke from 4 to 10% in the first 90 days. Thank you for referring such patient. If you have any questions regarding my consult note, please don't hesitate to call me. Katy Fragoso MD  [phone]    This dictation was generated by voice recognition computer software.  Although all attempts are made to edit the dictation for accuracy, there may be errors in the  transcription that are not intended

## 2020-08-06 NOTE — PROGRESS NOTES
Physical Therapy    Facility/Department: [address]  Initial Assessment/Discharge Summary    NAME: Jamal Dao  : 1935  MRN: [id number]    Date of Service: 2020    Discharge Recommendations: Jamal Dao scored a 24/24 on the AM-PAC short mobility form. At this time, no further PT is recommended upon discharge due to pt being at baseline functional mobility. Recommend patient returns to prior setting with prior services. PT Equipment Recommendations  Equipment Needed: No    Assessment   Assessment: Pt is presenting at baseline functional mobility and does not require skilled PT services at this time. As such, pt is being discharged from PT caseload. Prognosis: Excellent  Decision Making: Low Complexity  Clinical Presentation: stable  PT Education: PT Role;General Safety  Patient Education: d/c recommendations--pt verbalizing understanding  Barriers to Learning: none  REQUIRES PT FOLLOW UP: No  Activity Tolerance  Activity Tolerance: Patient Tolerated treatment well       Patient Diagnosis(es): The encounter diagnosis was Altered mental status, unspecified altered mental status type. has a past medical history of Arthritis, Atrial fibrillation ([address] Blood circulation, collateral, BPH (benign prostatic hypertrophy), Cataract of both eyes, Chronic kidney disease, Glaucoma, [place] (hard of hearing), Macular degeneration disease, Movement disorder, and Pneumonia. has a past surgical history that includes hernia repair; Dental surgery; and Intraocular lens insertion. Restrictions  Restrictions/Precautions  Restrictions/Precautions: Fall Risk, Up as Tolerated(medium fall risk; soft and bite sized, mildy thick liquids, no straws)  Required Braces or Orthoses?: No  Position Activity Restriction  Other position/activity restrictions: Pt admitted with acute encephalopathy with a left facial droop. Possible TIA.  MRI pending     Vision/Hearing  Vision: Impaired  Vision Exceptions: Wears glasses at all times  Hearing: Within functional limits       Subjective  General  Chart Reviewed: Yes  Response To Previous Treatment: Not applicable  Family / Caregiver Present: No  Diagnosis: TIA  Follows Commands: Within Functional Limits  General Comment  Comments: Pt supine in bed upon arrival.  Subjective  Subjective: Pt reporting no pain at this time. Agreeable to PT eval.  Pain Screening  Patient Currently in Pain: Denies  Vital Signs  Patient Currently in Pain: Denies       Orientation  Orientation  Overall Orientation Status: Within Functional Limits     Social/Functional History  Social/Functional History  Lives With: Alone  Type of Home: House  Home Layout: One level  Home Access: Level entry  Bathroom Shower/Tub: Tub/Shower unit  Bathroom Toilet: Standard  Bathroom Equipment: Grab bars in shower, Shower chair(pt has shower chair but does not use it; reports sitting into tub when he \"feels tired\")  Home Equipment: Standard walker, Cane, Reacher  ADL Assistance: Independent  Homemaking Assistance: Independent  Ambulation Assistance: Independent(wtih SPC intermittently)  Transfer Assistance: Independent  Active : Yes  Occupation: Retired  Leisure & Hobbies: [employer]ism  Additional Comments: Pt reports no recent falls. Pt states he has 2 sons, one lives in [facility] and [place] [facility]. Pt states son who lives in [facility] [facility] is available to assist with needs if needed. Cognition   Cognition  Overall Cognitive Status: Exceptions  Arousal/Alertness: Appropriate responses to stimuli  Following Commands: Follows multistep commands consistently  Attention Span: Attends with cues to redirect; Difficulty attending to directions  Memory: Decreased recall of recent events;Decreased short term memory;Decreased recall of biographical Information  Safety Judgement: Decreased awareness of need for assistance  Problem Solving: Assistance required to generate solutions;Assistance required to implement

## 2020-08-06 NOTE — ED NOTES
Swallow screen completed, passed with no sign of aspiration at this time.      Nasrin Alvarado RN  [id number]/[phone]

## 2020-08-06 NOTE — ED PROVIDER NOTES
[address] ENCOUNTER        Pt Name: Adama Rodríguez  MRN: [id number]  [place] 1935  Date of evaluation: 8/5/2020  Provider: Mik Stovall PA-C  PCP: Amaury Encinas MD     I have seen and evaluated this patient with my supervising physician Brandi Luther MD.      The total critical care time spent while evaluating and treating this patient was at least 33 minutes. This excludes time spent doing separately billable procedures. This includes time at the bedside, data interpretation, medication management, obtaining critical history from collateral sources if the patient is unable to provide it directly, and physician consultation. Specifics of interventions taken and potentially life-threatening diagnostic considerations are listed above in the medical decision making. CHIEF COMPLAINT       Chief Complaint   Patient presents with    Altered Mental Status     Pt at [facility], brought in via [facility], friends from [facility] called and stated that patient had altered mental status. Stroke alert called. HISTORY OF PRESENT ILLNESS   (Location, Timing/Onset, Context/Setting, Quality, Duration, Modifying Factors, Severity, Associated Signs and Symptoms)  Note limiting factors. Adama Rodríguez is a 81 y.o. male presents to the emergency department via [employer]. I was asked to evaluate this patient at the charge nurses station for possible stroke alert. Per squad patient's glucose was 111 in the field. They were called due to acute confusion that began approximately 30 minutes before presenting to the emergency department while he was at [facility]. Apparently the patient was acting confused and staggering around and actually had his face mask over his eyes and head and not his mouth. Patient denies any pain. Squad noticed some left-sided facial droop in the field that is now gone and not noticed by myself or charge nurse. Patient is alert and oriented x3. He is unsure of why he is here and is a poor historian but is able to state that he drove to [employer]. That he was going to 7 PM mass but is unsure of what really happened that brought him to the hospital or why the squad was called. Denies any pain or symptoms at this time. Denies chest pain, abdominal pain, nausea, vomiting, urinary or bowel symptoms. Nursing Notes were all reviewed and agreed with or any disagreements were addressed in the HPI. REVIEW OF SYSTEMS    (2-9 systems for level 4, 10 or more for level 5)     Review of Systems    Positives and Pertinent negatives as per HPI. Except as noted above in the ROS, all other systems were reviewed and negative. PAST MEDICAL HISTORY     Past Medical History:   Diagnosis Date    Arthritis     cervical degenerative joint disease    Atrial fibrillation ([address]     Blood circulation, collateral     BPH (benign prostatic hypertrophy)     Cataract of both eyes     Chronic kidney disease     Glaucoma     [place] (hard of hearing)     Macular degeneration disease     Movement disorder     degenerative joint disease    Pneumonia     twice in past 2 years         SURGICAL HISTORY     Past Surgical History:   Procedure Laterality Date    DENTAL SURGERY      HERNIA REPAIR      INTRAOCULAR LENS PROSTHESIS INSERTION           CURRENTMEDICATIONS       Previous Medications    ASPIRIN 325 MG TABLET    Take 1 tablet by mouth daily. OMEPRAZOLE (PRILOSEC) 40 MG DELAYED RELEASE CAPSULE    Take 40 mg by mouth daily    THERAPEUTIC MULTIVITAMIN-MINERALS (THERAGRAN-M) TABLET    Take 1 tablet by mouth daily.          ALLERGIES     Levaquin [levofloxacin] and Moxifloxacin    FAMILYHISTORY       Family History   Problem Relation Age of Onset    Depression Mother     Vision Loss Mother           SOCIAL HISTORY       Social History     Tobacco Use    Smoking status: Former Smoker     Last attempt to quit: 9/21/1973     Years since quittin.9    Smokeless tobacco: Never Used   Substance Use Topics    Alcohol use: No    Drug use: Not on file       SCREENINGS   NIH Stroke Scale  NIH Stroke Scale Assessed: Yes  Interval: Baseline  Level of Consciousness (1a. ): Alert  LOC Questions (1b. ): Answers both correctly  LOC Commands (1c. ): Performs both tasks correctly  Best Gaze (2. ): Normal  Visual (3. ): No visual loss  Facial Palsy (4. ): Normal symmetrical movement  Motor Arm, Left (5a. ): No drift  Motor Arm, Right (5b. ): No drift  Motor Leg, Left (6a. ): No drift  Motor Leg, Right (6b. ): No drift  Limb Ataxia (7. ): Absent  Sensory (8. ): Normal  Best Language (9. ): No aphasia  Dysarthria (10. ): Normal  Extinction and Inattention (11): No abnormality  Total: 0         PHYSICAL EXAM    (up to 7 for level 4, 8 or more for level 5)     ED Triage Vitals   BP Temp Temp src Pulse Resp SpO2 Height Weight   -- -- -- -- -- -- -- --       Physical Exam  Vitals signs and nursing note reviewed. Constitutional:       Appearance: He is well-developed. He is not diaphoretic. HENT:      Head: Atraumatic. Nose: Nose normal.   Eyes:      General:         Right eye: No discharge. Left eye: No discharge. Extraocular Movements: Extraocular movements intact. Pupils: Pupils are equal, round, and reactive to light. Neck:      Musculoskeletal: Normal range of motion. Cardiovascular:      Rate and Rhythm: Normal rate and regular rhythm. Heart sounds: No murmur. No friction rub. No gallop. Pulmonary:      Effort: Pulmonary effort is normal. No respiratory distress. Breath sounds: No stridor. No wheezing, rhonchi or rales. Abdominal:      General: Bowel sounds are normal. There is no distension. Palpations: Abdomen is soft. There is no mass. Tenderness: There is no abdominal tenderness. There is no guarding or rebound. Hernia: No hernia is present. Musculoskeletal: Normal range of motion. General: No swelling. Skin:     General: Skin is warm and dry. Findings: No erythema or rash. Neurological:      Mental Status: He is alert and oriented to person, place, and time. Cranial Nerves: No cranial nerve deficit. Comments: Cranial nerves II through XII grossly intact. No dysarthria or aphasia noted. No abnormalities of finger-to-nose testing or limb ataxia noted. No sensorimotor deficits appreciated.    Psychiatric:         Behavior: Behavior normal.         DIAGNOSTIC RESULTS   LABS:    Labs Reviewed   CBC WITH AUTO DIFFERENTIAL - Abnormal; Notable for the following components:       Result Value    RBC 3.87 (*)     Hemoglobin 13.0 (*)     Hematocrit 37.6 (*)     All other components within normal limits    Narrative:     Performed at:  [facility] [address]   Phone [phone]   COMPREHENSIVE METABOLIC PANEL W/ REFLEX TO MG FOR LOW K - Abnormal; Notable for the following components:    BUN 33 (*)     All other components within normal limits    Narrative:     Performed at:  [facility] [address]   Phone [phone]   TROPONIN    Narrative:     Performed at:  [facility] [address], [place]   Phone [phone]   PROTIME-INR    Narrative:     Performed at:  [facility] [address], [place]   Phone [phone]   URINE RT REFLEX TO CULTURE    Narrative:     Performed at:  [facility] [address], [place]   Phone [phone]   AMMONIA    Narrative:     Performed at:  [facility] [address], [place]   Phone [phone]   LIPASE    Narrative:     Performed at:  [facility] [address]   Phone [phone]   LIPID PANEL   CBC   POCT GLUCOSE       All other labs were within normal range or not returned as of this dictation. EKG: All EKG's are interpreted by the Emergency Department Physician in the absence of a cardiologist.  Please see their note for interpretation of EKG. RADIOLOGY:   Non-plain film images such as CT, Ultrasound and MRI are read by the radiologist. Plain radiographic images are visualized and preliminarily interpreted by the ED Provider with the below findings:        Interpretation per the Radiologist below, if available at the time of this note:    XR CHEST PORTABLE   Final Result   Increased lung markings at the bilateral parahilar regions, may be related to   mild pulmonary vascular congestion versus bronchitis. CTA HEAD NECK W CONTRAST   Final Result   No acute arterial abnormality or hemodynamically significant arterial   stenosis in the head or neck. CT HEAD WO CONTRAST   Final Result   Addendum 1 of 1   ADDENDUM:   Additional scan was performed after the patient hit his head on the CT   scanner. There is contrast within the vasculature, which limits detection   intracranial hemorrhage. That said, no masses or hemorrhages within the   brain parenchyma are found. Gray-white matter differentiation is    maintained. There is a small focus of enhancement seen within the rightward aspect of    the   hai measuring approximately 5 x 3 mm (series 2, image 19). 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       All in all, no acute traumatic abnormalities are detected         Final      MRI brain without contrast    (Results Pending)     Ct Head Wo Contrast    Result Date: 8/5/2020  EXAMINATION: CT OF THE HEAD WITHOUT CONTRAST  8/5/2020 4:45 pm TECHNIQUE: CT of the head was performed without the administration of intravenous contrast. Dose modulation, iterative reconstruction, and/or weight based adjustment of the mA/kV was utilized to reduce the radiation dose to as low as reasonably achievable. COMPARISON: 12/05/2017 HISTORY: ORDERING SYSTEM PROVIDED HISTORY: AMS TECHNOLOGIST PROVIDED HISTORY: Has a \"code stroke\" or \"stroke alert\" been called? ->Yes Reason for exam:->AMS Reason for Exam: AMS. Acuity: Acute Type of Exam: Initial FINDINGS: BRAIN/VENTRICLES:  No acute loss of the gray-white matter differentiation is identified to suggest acute or subacute infarct. No masses or hemorrhages within the brain parenchyma are found. No evidence of midline shift. There is mild periventricular low-attenuation, compatible with chronic small vessel ischemic disease. The intracranial vasculature, including the dural venous sinuses, is within normal limits. ORBITS: No acute orbital abnormalities are identified. SINUSES: The visualized paranasal sinuses and mastoid air cells are clear. SOFT TISSUES/SKULL: The calvarium is intact. Extracranial soft tissues are unremarkable. No acute intracranial abnormality. Findings were discussed with Teresa Gill at 7:55 pm on 8/5/2020. PROCEDURES   Unless otherwise noted below, none     Procedures    CRITICAL CARE TIME   N/A    CONSULTS:   1. Dr. Mateo Marsh stroke team  2.  Dr. Kyleigh Escudero -hospitalist  Rachel Little COURSE and DIFFERENTIAL DIAGNOSIS/MDM:   Vitals:    Vitals:    08/05/20 2010 08/05/20 2013   BP: (!) 145/76 (!) 145/76   Pulse: 76    Resp: 16    Temp: 97.6 °F (36.4 °C)    SpO2: 100%    Weight: 140 lb (63.5 kg)    Height: 5' 9\" (1.753 m)        Patient was given the following medications:  Medications   sodium chloride flush 0.9 % injection 10 mL (has no administration in time range)   sodium chloride flush 0.9 % injection 10 mL (has no administration in time range)   polyethylene glycol (GLYCOLAX)

## 2020-08-06 NOTE — PROGRESS NOTES
Comprehensive Nutrition Assessment    Type and Reason for Visit:  Initial, Positive Nutrition Screen(MST 2 poor appetite and wt loss)    Nutrition Recommendations/Plan:   1. Continue current diet per SLP  2. RD ordered Ensure Enlive BID  3. Encourage po    Nutrition Assessment:  Pt is at risk for nutritional compromise d/t poor intake and possible wt loss. Pt has not been eating well per chart. RD tried to see pt multiple times however pt was not in room. Pt dx with TIA, SLP following and MBS completed with diet now Dysphagia III soft and bite sized with thins. Pt was NPO prior to MBS. RD to order Ensure BID. Pt will continue to be at risk for nutritional compromise until po is 50% or more consistently at meals and supp. Malnutrition Assessment:  Malnutrition Status:  Insufficient data    Context:  Chronic Illness     Findings of the 6 clinical characteristics of malnutrition:  Energy Intake:  Unable to assess  Weight Loss:  Unable to assess     Body Fat Loss:  Unable to assess     Muscle Mass Loss:  Unable to assess    Fluid Accumulation:  No significant fluid accumulation     Strength:  Not Performed    Estimated Daily Nutrient Needs:  Energy (kcal):  3164-3386 (25-30 cals/kg 61kg); Weight Used for Energy Requirements:  Current     Protein (g):  76-79 gms (1.25-1.3 gms/kg 61kg); Weight Used for Protein Requirements:  Current        Fluid (ml/day):  1 ml/kcal; Weight Used for Fluid Requirements:  Current      Nutrition Related Findings:  No edema      Wounds:  None       Current Nutrition Therapies:    DIET DYSPHAGIA SOFT AND BITE-SIZED; No Drinking Straw    Anthropometric Measures:  · Height: 5' 9\" (175.3 cm)  · Current Body Weight: 134 lb 7.7 oz (61 kg)   · Ideal Body Weight: 160 lbs; % Ideal Body Weight     · BMI: 19.9  · BMI Categories: Normal Weight (BMI 18.5-24. 9)       Nutrition Diagnosis:   · Inadequate energy intake related to swallowing difficulty, inadequate protein-energy intake as evidenced by swallow study results, poor intake prior to admission      Nutrition Interventions:   Food and/or Nutrient Delivery:  Continue Current Diet, Start Oral Nutrition Supplement  Nutrition Education/Counseling:  No recommendation at this time   Coordination of Nutrition Care:  Continued Inpatient Monitoring    Goals:  PO 50% or more at meals/supp       Nutrition Monitoring and Evaluation:   Food/Nutrient Intake Outcomes:  Food and Nutrient Intake, Supplement Intake  Physical Signs/Symptoms Outcomes:  Weight, Chewing or Swallowing     Discharge Planning:     Too soon to determine     Electronically signed by Callum Cooper RD, [address] , LD on 8/6/20 at 3:14 PM EDT    Contact: [phone]

## 2020-08-06 NOTE — PROGRESS NOTES
Occupational Therapy   Occupational Therapy Initial Assessment/Discharge Summary  Date: 2020   Patient Name: Marii Minor  MRN: [id number]     : 1935    Date of Service: 2020    Discharge Recommendations: Marii Minor scored a 22/24 on the AM-PAC ADL Inpatient form. Current research shows that an AM-PAC score of 18 or greater is typically associated with a discharge to the patient's home setting. Based on the patient's AM-PAC score, and their current ADL deficits, it is recommended that the patient have 2-3 sessions per week of Occupational Therapy at d/c to increase the patient's independence. At this time, this patient demonstrates the endurance and safety to discharge home with OT OP services and a follow up treatment frequency of 2-3x/wk. Please see assessment section for further patient specific details. *Pt would benefit from OP OT 's evaluation secondary to pt's decreased cognition, short term memory, and problem solving abilities. If patient discharges prior to next session this note will serve as a discharge summary. Please see below for the latest assessment towards goals. OT Equipment Recommendations  Equipment Needed: No  Other: recommended to pt to use shower chair    Assessment   Performance deficits / Impairments: Decreased cognition  Assessment: Pt appears at or near baseline level of function. Recommend OP OT 's evaluation as pt presents with decreased memory, problem solving, and short term memory. Anticipate no additional OT in hospital setting at this time. Treatment Diagnosis: Decreased cognition  Prognosis: Good  Decision Making: Low Complexity  OT Education: OT Role;IADL Safety;Equipment;Orientation  Patient Education: d/c recommendations - pt verbalized understanding but learning limited d/t cognition  Barriers to Learning: cognition  No Skilled OT: No OT goals identified; At baseline function  REQUIRES OT FOLLOW UP: No  Activity Tolerance  Activity Tolerance: Patient Tolerated treatment well  Safety Devices  Safety Devices in place: Yes  Type of devices: All fall risk precautions in place; Left in chair;Call light within reach; Chair alarm in place;Nurse notified; Patient at risk for falls           Patient Diagnosis(es): The encounter diagnosis was Altered mental status, unspecified altered mental status type. has a past medical history of Arthritis, Atrial fibrillation ([address] Blood circulation, collateral, BPH (benign prostatic hypertrophy), Cataract of both eyes, Chronic kidney disease, Glaucoma, [place] (hard of hearing), Macular degeneration disease, Movement disorder, and Pneumonia. has a past surgical history that includes hernia repair; Dental surgery; and Intraocular lens insertion. Treatment Diagnosis: Decreased cognition      Restrictions  Restrictions/Precautions  Restrictions/Precautions: Fall Risk, Up as Tolerated(medium fall risk; soft and bite sized, mildy thick liquids, no straws)  Required Braces or Orthoses?: No  Position Activity Restriction  Other position/activity restrictions: Pt admitted with acute encephalopathy with a left facial droop. Possible TIA. MRI pending    Subjective   General  Chart Reviewed: Yes  Additional Pertinent Hx: arthritis, macular degeneration, glaucoma, CKD, a-fib  Family / Caregiver Present: No  Diagnosis: TIA  Subjective  Subjective: Pt in stance in room with PT upon arrival, pt agreeable to OT evaluation. Pt easily distractible, goes off on tangets during conversation. Pt easily redirected.  Pt mentioned on two occasions during evaluation that his memory is \"not the best.\"  Patient Currently in Pain: Denies(\"nagging\" R ft, denied pain)    Social/Functional History  Social/Functional History  Lives With: Alone  Type of Home: House  Home Layout: One level  Home Access: Level entry  Bathroom Shower/Tub: Tub/Shower unit  Bathroom Toilet: Standard  Bathroom Equipment: Grab bars in shower, Shower chair(pt has shower chair but does not use it; reports sitting into tub when he \"feels tired\")  Home Equipment: Standard walker, Cane, Reacher  ADL Assistance: Independent  Homemaking Assistance: Independent  Ambulation Assistance: Independent(wtih SPC intermittently)  Transfer Assistance: Independent  Active : Yes  Occupation: Retired  Leisure & Hobbies: Christianity  Additional Comments: Pt reports no recent falls. Pt states he has 2 sons, one lives in [facility] and one in [place] [facility]. Pt states son who lives in [facility] is available to assist with needs if needed. Objective   Vision: Impaired  Vision Exceptions: Wears glasses at all times  Hearing: Within functional limits    Orientation  Overall Orientation Status: Impaired  Orientation Level: Oriented to person;Oriented to place;Oriented to time;Disoriented to situation  Observation/Palpation  Posture: Good  Balance  Sitting Balance: Independent  Standing Balance: Modified independent   Standing Balance  Time: up to ~20 min total  Activity: functional mobility to/from bathroom, ADLs  Comment: no AD, no LOB  Functional Mobility  Functional - Mobility Device: No device  Activity: To/from bathroom  Assist Level: Independent  Toilet Transfers  Toilet - Technique: Ambulating  Equipment Used: Standard toilet(R grab bar)  Toilet Transfer: Modified independent  ADL  Grooming: Modified independent   UE Bathing: Modified independent   LE Bathing: Modified independent   UE Dressing: Modified independent   LE Dressing: Supervision;Verbal cueing  Toileting: Modified independent   Additional Comments: Pt ambulated to/from bathroom to complete the above ADLs. Pt washed UB/LB (did not wash feet) in stance position, no LOB, mod I. Pt required cues to transfer onto toilet to safely doff/don pants/depends around ankles. Pt easily distractible and required cues about 25% of the time to attend to/remain on ADL task.   Tone RUE  RUE Tone: Normotonic  Tone LUE  LUE Tone: Normotonic  Coordination  Movements Are Fluid And Coordinated: Yes        Transfers  Sit to stand: Modified independent  Stand to sit: Modified independent  Vision - Basic Assessment  Prior Vision: Wears glasses all the time  Visual History: Macular degeneration;Glaucoma  Patient Visual Report: No visual complaint reported. Cognition  Overall Cognitive Status: Exceptions  Arousal/Alertness: Appropriate responses to stimuli  Following Commands: Follows multistep commands consistently  Attention Span: Attends with cues to redirect; Difficulty attending to directions  Memory: Decreased recall of recent events;Decreased short term memory;Decreased recall of biographical Information  Safety Judgement: Decreased awareness of need for assistance  Problem Solving: Assistance required to generate solutions;Assistance required to implement solutions  Insights: Decreased awareness of deficits  Initiation: Does not require cues  Sequencing: Does not require cues        Sensation  Overall Sensation Status: WFL        LUE AROM (degrees)  LUE AROM : WFL  RUE AROM (degrees)  RUE AROM : WFL  LUE Strength  Gross LUE Strength: WFL  RUE Strength  Gross RUE Strength: WFL                                 AM-PAC Score        AM-PAC Inpatient Daily Activity Raw Score: 22 (08/06/20 1213)  AM-PAC Inpatient ADL T-Scale Score : 47.1 (08/06/20 1213)  ADL Inpatient CMS 0-100% Score: 25.8 (08/06/20 1213)  ADL Inpatient CMS G-Code Modifier : CJ (08/06/20 1213)    Goals  Short term goals  Time Frame for Short term goals: No acute OT goals identified       Therapy Time   Individual Concurrent Group Co-treatment   Time In 1120         Time Out 1159         Minutes 39               Timed Code Treatment Minutes:   24    Total Treatment Minutes:  [address], [address] [facility], [address], [id number]

## 2020-08-11 NOTE — DISCHARGE SUMMARY
[facility] DISCHARGE SUMMARY    Patient Demographics    Patient. Laura Pierson  Date of Birth. 1935  MRN. [id number]     Primary care provider. Darshan Espinoza MD  (Tel: [phone])    Admit date: 8/5/2020    Discharge date (blank if same as Note Date): 8/6/2020  Note Date: 8/11/2020     Reason for Hospitalization. Chief Complaint   Patient presents with    Altered Mental Status     Pt at [facility], brought in via [facility], friends from [facility] called and stated that patient had altered mental status. Stroke alert called. [facility] Course. 1. TIA  -Improving. Acute stroke was ruled out  Discharge stable. Spent statin n  -PT OT eval's    Consults. IP CONSULT TO PHARMACY  PHARMACY TO CHANGE BASE FLUIDS  IP CONSULT TO HOSPITALIST  IP CONSULT TO NEUROLOGY  IP CONSULT TO NEUROLOGY    Physical examination on discharge day. BP (!) 155/75   Pulse 57   Temp 97.4 °F (36.3 °C) (Temporal)   Resp 17   Ht 5' 9\" (1.753 m)   Wt 134 lb 12.8 oz (61.1 kg)   SpO2 98%   BMI 19.91 kg/m²   General appearance. Alert. Looks comfortable. HEENT. Sclera clear. Moist mucus membranes. Cardiovascular. Regular rate and rhythm, normal S1, S2. No murmur. Respiratory. Not using accessory muscles. Clear to auscultation bilaterally, no wheeze. Gastrointestinal. Abdomen soft, non-tender, not distended, normal bowel sounds  Neurology. Facial symmetry. No speech deficits. Moving all extremities equally. Extremities. No edema in lower extremities. Skin. Warm, dry, normal turgor    Condition at time of discharge stable     Medication instructions provided to patient at discharge.      Medication List      START taking these medications    atorvastatin 40 MG tablet  Commonly known as:  LIPITOR  Take 1 tablet by mouth nightly  Notes to patient:  Use: lowers cholesterol and chance of stroke  Side effect: leg cramping, stomach upset        CONTINUE taking these medications    aspirin 325 MG tablet  Take 1 tablet by mouth daily. Notes to patient:  Use: anticoagulant for heart and cardiovascular health  Side effects: possible easy bleeding, stomach upset, take with food     omeprazole 40 MG delayed release capsule  Commonly known as:  PRILOSEC  Notes to patient:  Use: lowers stomach acid content to reduce risk for stomach ulcer  Side effect: nausea     therapeutic multivitamin-minerals tablet        ASK your doctor about these medications    meloxicam 15 MG tablet  Commonly known as:  MOBIC  Notes to patient:  Use: non steroid antiinflammatory to reduce inflammation and pain  Side effect:  Upset stomach, TAKE WITH FOOD,   Nausea, lightheadedness. tamsulosin 0.4 MG capsule  Commonly known as:  FLOMAX  Notes to patient:  Use: aids with urination, when urination difficult due to enlarged prostate  Side effects: dizziness, lightheadedness. Where to Get Your Medications      These medications were sent to [facility] [address], [address], [facility]    Phone:  [phone]   · atorvastatin 40 MG tablet         Discharge recommendations given to patient. Follow Up. pcp in 1 week   Disposition. home  Activity. activity as tolerated  Diet: No diet orders on file      Spent 45  minutes in discharge process.     Signed:  Hailey Sales MD     8/11/2020 4:36 PM